# Patient Record
Sex: FEMALE | URBAN - METROPOLITAN AREA
[De-identification: names, ages, dates, MRNs, and addresses within clinical notes are randomized per-mention and may not be internally consistent; named-entity substitution may affect disease eponyms.]

---

## 2020-08-06 ENCOUNTER — APPOINTMENT (OUTPATIENT)
Age: 50
Setting detail: DERMATOLOGY
End: 2020-08-06

## 2020-08-06 VITALS — HEIGHT: 65 IN | TEMPERATURE: 98 F

## 2020-08-06 DIAGNOSIS — L65.9 NONSCARRING HAIR LOSS, UNSPECIFIED: ICD-10-CM

## 2020-08-06 PROCEDURE — OTHER TREATMENT REGIMEN: OTHER

## 2020-08-06 PROCEDURE — OTHER FOLLOW UP FOR NEXT VISIT: OTHER

## 2020-08-06 PROCEDURE — 99202 OFFICE O/P NEW SF 15 MIN: CPT

## 2020-08-06 PROCEDURE — OTHER MIPS QUALITY: OTHER

## 2020-08-06 PROCEDURE — OTHER ORDER TESTS: OTHER

## 2020-08-06 PROCEDURE — OTHER COUNSELING: OTHER

## 2020-08-06 ASSESSMENT — LOCATION SIMPLE DESCRIPTION DERM: LOCATION SIMPLE: ANTERIOR SCALP

## 2020-08-06 ASSESSMENT — LOCATION ZONE DERM: LOCATION ZONE: SCALP

## 2020-08-06 ASSESSMENT — LOCATION DETAILED DESCRIPTION DERM: LOCATION DETAILED: MID-FRONTAL SCALP

## 2020-08-06 NOTE — PROCEDURE: TREATMENT REGIMEN
Detail Level: Zone
Plan: Patient has noticed diffuse hair thinning over the last 3 months. I informed her it is most likely representative of telegen effluvium. She brought her recent blood work results that all looked normal, other than slight low vitamin D levels that she is already supplementing for. We will order more labs including B12 and hormone levels. We discussed options including OTC Rogaine 2%, fenasteride, spironolactone, PRP injections, scalp biopsy, or just watch hair over the next couple of months. We will discuss which option to pursue based on the blood work results.

## 2020-08-06 NOTE — HPI: HAIR LOSS
Previous Labs: Yes
How Did The Hair Loss Occur?: sudden in onset
How Severe Is Your Hair Loss?: moderate
Additional History: Patient with progressive hair loss for the past three months. Scalp is asymptomatic: no itching, no burning, no flaking. She has not started any new medications, no recent diet changes. She did have recent bloodwork last month with her primary care physician which was apparently normal. She has no history of thyroid disease. She does have a chronically low vitamin D level but she does take vitamin D supplementation. She does not have menstrual cycles because she is on birth control pills but otherwise no specific hormonal changes that she is aware of